# Patient Record
Sex: MALE | Race: WHITE | NOT HISPANIC OR LATINO | URBAN - METROPOLITAN AREA
[De-identification: names, ages, dates, MRNs, and addresses within clinical notes are randomized per-mention and may not be internally consistent; named-entity substitution may affect disease eponyms.]

---

## 2022-07-09 ENCOUNTER — TELEPHONE ENCOUNTER (OUTPATIENT)
Dept: URBAN - METROPOLITAN AREA CLINIC 121 | Facility: CLINIC | Age: 82
End: 2022-07-09

## 2022-07-09 RX ORDER — SIMVASTATIN 20 MG/1
TABLET, FILM COATED ORAL
Refills: 0 | OUTPATIENT
Start: 2014-02-11 | End: 2016-05-26

## 2022-07-09 RX ORDER — ASPIRIN 81 MG/1
TABLET, CHEWABLE ORAL
Refills: 0 | OUTPATIENT
Start: 2014-02-11 | End: 2016-05-26

## 2022-07-09 RX ORDER — AMOXICILLIN AND CLAVULANATE POTASSIUM 875; 125 MG/1; MG/1
TABLET ORAL TWICE A DAY
Refills: 0 | OUTPATIENT
Start: 2016-08-04 | End: 2016-09-15

## 2022-07-09 RX ORDER — DESONIDE 0.5 MG/G
CREAM TOPICAL
Refills: 0 | OUTPATIENT
Start: 2016-07-29 | End: 2016-08-04

## 2022-07-09 RX ORDER — BISOPROLOL FUMARATE 10 MG/1
TABLET ORAL ONCE A DAY
Refills: 0 | OUTPATIENT
Start: 2016-05-26 | End: 2016-07-29

## 2022-07-09 RX ORDER — AMOXICILLIN AND CLAVULANATE POTASSIUM 875; 125 MG/1; MG/1
TWICE A DAY TABLET ORAL TWICE A DAY
Refills: 0 | OUTPATIENT
Start: 2016-07-29 | End: 2016-08-04

## 2022-07-09 RX ORDER — ERTAPENEM SODIUM 1 G/1
INJECT 50 ML EVERY DAY INJECTION, POWDER, LYOPHILIZED, FOR SOLUTION INTRAMUSCULAR; INTRAVENOUS
Refills: 0 | OUTPATIENT
Start: 2016-07-29 | End: 2016-08-04

## 2022-07-09 RX ORDER — OMEPRAZOLE 40 MG/1
CAPSULE, DELAYED RELEASE ORAL ONCE A DAY
Refills: 0 | OUTPATIENT
Start: 2016-07-29 | End: 2016-08-04

## 2022-07-09 RX ORDER — OMEPRAZOLE 40 MG/1
CAPSULE, DELAYED RELEASE ORAL ONCE A DAY
Refills: 0 | OUTPATIENT
Start: 2016-05-26 | End: 2016-07-29

## 2022-07-09 RX ORDER — GLUCOSAM/MSM/CHOND/HRB149/HYAL 500-500 MG
TABLET ORAL
Refills: 0 | OUTPATIENT
Start: 2014-02-11 | End: 2016-05-26

## 2022-07-09 RX ORDER — BISOPROLOL FUMARATE 5 MG/1
1/2 TAB BID TABLET ORAL
Refills: 0 | OUTPATIENT
Start: 2016-07-29 | End: 2016-08-04

## 2022-07-09 RX ORDER — BISOPROLOL FUMARATE 5 MG/1
1/2 TAB BID TABLET ORAL
Refills: 0 | OUTPATIENT
Start: 2016-08-04 | End: 2016-09-15

## 2022-07-09 RX ORDER — ASPIRIN 81 MG/1
TABLET, CHEWABLE ORAL ONCE A DAY
Refills: 0 | OUTPATIENT
Start: 2016-05-26 | End: 2016-07-29

## 2022-07-09 RX ORDER — APIXABAN 5 MG/1
TABLET, FILM COATED ORAL TWICE A DAY
Refills: 0 | OUTPATIENT
Start: 2016-05-26 | End: 2016-07-29

## 2022-07-09 RX ORDER — DESONIDE 0.5 MG/G
CREAM TOPICAL
Refills: 0 | OUTPATIENT
Start: 2016-08-04 | End: 2016-09-15

## 2022-07-09 RX ORDER — OMEPRAZOLE 40 MG/1
CAPSULE, DELAYED RELEASE ORAL
Refills: 0 | OUTPATIENT
Start: 2014-02-11 | End: 2016-05-26

## 2022-07-09 RX ORDER — ATENOLOL 25 MG/1
TABLET ORAL
Refills: 0 | OUTPATIENT
Start: 2014-02-11 | End: 2016-05-26

## 2022-07-09 RX ORDER — ERTAPENEM SODIUM 1 G/1
INJECT 50 ML EVERY DAY INJECTION, POWDER, LYOPHILIZED, FOR SOLUTION INTRAMUSCULAR; INTRAVENOUS
Refills: 0 | OUTPATIENT
Start: 2016-08-04 | End: 2016-09-15

## 2022-07-09 RX ORDER — SIMVASTATIN 20 MG/1
TABLET, FILM COATED ORAL ONCE A DAY
Refills: 0 | OUTPATIENT
Start: 2016-05-26 | End: 2016-07-29

## 2022-07-09 RX ORDER — OMEPRAZOLE 40 MG/1
CAPSULE, DELAYED RELEASE ORAL ONCE A DAY
Refills: 0 | OUTPATIENT
Start: 2016-08-04 | End: 2016-09-15

## 2022-07-09 RX ORDER — ASPIRIN 81 MG/1
TABLET, CHEWABLE ORAL ONCE A DAY
Refills: 0 | OUTPATIENT
Start: 2016-08-04 | End: 2016-09-15

## 2022-07-09 RX ORDER — ASPIRIN 81 MG/1
TABLET, CHEWABLE ORAL ONCE A DAY
Refills: 0 | OUTPATIENT
Start: 2016-07-29 | End: 2016-08-04

## 2022-07-10 ENCOUNTER — TELEPHONE ENCOUNTER (OUTPATIENT)
Dept: URBAN - METROPOLITAN AREA CLINIC 121 | Facility: CLINIC | Age: 82
End: 2022-07-10

## 2022-07-10 RX ORDER — SIMVASTATIN 20 MG/1
TABLET, FILM COATED ORAL ONCE A DAY
Refills: 0 | Status: ACTIVE | COMMUNITY
Start: 2016-09-15

## 2022-07-10 RX ORDER — AMOXICILLIN AND CLAVULANATE POTASSIUM 875; 125 MG/1; MG/1
TABLET ORAL TWICE A DAY
Refills: 0 | Status: ACTIVE | COMMUNITY
Start: 2016-09-15

## 2022-07-10 RX ORDER — BISOPROLOL FUMARATE 5 MG/1
1/2 TAB BID TABLET ORAL TWICE A DAY
Refills: 0 | Status: ACTIVE | COMMUNITY
Start: 2016-09-15

## 2022-07-10 RX ORDER — ASPIRIN 81 MG/1
TABLET, CHEWABLE ORAL ONCE A DAY
Refills: 0 | Status: ACTIVE | COMMUNITY
Start: 2016-09-15

## 2022-07-10 RX ORDER — OMEPRAZOLE 40 MG/1
CAPSULE, DELAYED RELEASE ORAL ONCE A DAY
Refills: 0 | Status: ACTIVE | COMMUNITY
Start: 2016-09-15

## 2022-07-30 ENCOUNTER — TELEPHONE ENCOUNTER (OUTPATIENT)
Age: 82
End: 2022-07-30

## 2022-07-31 ENCOUNTER — TELEPHONE ENCOUNTER (OUTPATIENT)
Age: 82
End: 2022-07-31

## 2024-05-06 ENCOUNTER — DASHBOARD ENCOUNTERS (OUTPATIENT)
Age: 84
End: 2024-05-06

## 2024-05-06 ENCOUNTER — OFFICE VISIT (OUTPATIENT)
Dept: URBAN - METROPOLITAN AREA CLINIC 9 | Facility: CLINIC | Age: 84
End: 2024-05-06
Payer: COMMERCIAL

## 2024-05-06 VITALS
WEIGHT: 149 LBS | DIASTOLIC BLOOD PRESSURE: 70 MMHG | HEIGHT: 71 IN | BODY MASS INDEX: 20.86 KG/M2 | SYSTOLIC BLOOD PRESSURE: 110 MMHG

## 2024-05-06 DIAGNOSIS — K76.0 FATTY LIVER: ICD-10-CM

## 2024-05-06 DIAGNOSIS — R19.4 CHANGE IN BOWEL HABITS: ICD-10-CM

## 2024-05-06 DIAGNOSIS — R17 ELEVATED BILIRUBIN: ICD-10-CM

## 2024-05-06 DIAGNOSIS — R63.4 WEIGHT LOSS: ICD-10-CM

## 2024-05-06 PROBLEM — 197321007: Status: ACTIVE | Noted: 2024-05-06

## 2024-05-06 PROCEDURE — 99204 OFFICE O/P NEW MOD 45 MIN: CPT | Performed by: STUDENT IN AN ORGANIZED HEALTH CARE EDUCATION/TRAINING PROGRAM

## 2024-05-06 RX ORDER — GLUCOSAM/CHONDRO/HERB 149/HYAL 750-100 MG
AS DIRECTED TABLET ORAL
Status: ACTIVE | COMMUNITY

## 2024-05-06 RX ORDER — APIXABAN 5 MG/1
1 TABLET TABLET, FILM COATED ORAL
Status: ACTIVE | COMMUNITY

## 2024-05-06 RX ORDER — ASPIRIN 81 MG/1
TABLET, CHEWABLE ORAL ONCE A DAY
Refills: 0 | Status: DISCONTINUED | COMMUNITY
Start: 2016-09-15

## 2024-05-06 RX ORDER — SIMVASTATIN 20 MG/1
TABLET, FILM COATED ORAL ONCE A DAY
Refills: 0 | Status: DISCONTINUED | COMMUNITY
Start: 2016-09-15

## 2024-05-06 RX ORDER — AMOXICILLIN AND CLAVULANATE POTASSIUM 875; 125 MG/1; MG/1
TABLET ORAL TWICE A DAY
Refills: 0 | Status: DISCONTINUED | COMMUNITY
Start: 2016-09-15

## 2024-05-06 RX ORDER — MULTIVIT WITH MINERALS/LUTEIN
AS DIRECTED TABLET ORAL
Status: ACTIVE | COMMUNITY

## 2024-05-06 RX ORDER — OMEPRAZOLE 40 MG/1
CAPSULE, DELAYED RELEASE ORAL ONCE A DAY
Refills: 0 | Status: ACTIVE | COMMUNITY
Start: 2016-09-15

## 2024-05-06 RX ORDER — BISOPROLOL FUMARATE 5 MG/1
1/2 TAB BID TABLET ORAL TWICE A DAY
Refills: 0 | Status: ACTIVE | COMMUNITY
Start: 2016-09-15

## 2024-05-09 LAB — BILIRUBIN, DIRECT: 0.3

## 2024-05-10 LAB
A/G RATIO: 0.7
ABSOLUTE BASOPHILS: 29
ABSOLUTE EOSINOPHILS: 130
ABSOLUTE LYMPHOCYTES: 760
ABSOLUTE MONOCYTES: 458
ABSOLUTE NEUTROPHILS: 2822
ALBUMIN: 3.3
ALKALINE PHOSPHATASE: 37
ALT (SGPT): 17
AST (SGOT): 32
BASOPHILS: 0.7
BILIRUBIN, TOTAL: 1.1
BUN/CREATININE RATIO: (no result)
BUN: 13
CALCIUM: 8.8
CARBON DIOXIDE, TOTAL: 26
CHLORIDE: 101
CREATININE: 0.71
EGFR: 92
EOSINOPHILS: 3.1
GLOBULIN, TOTAL: 4.5
GLUCOSE: 87
HEMATOCRIT: 38.2
HEMOGLOBIN: 12.6
INR: 1.2
LYMPHOCYTES: 18.1
MCH: 34.6
MCHC: 33
MCV: 104.9
MONOCYTES: 10.9
MPV: 9.7
NEUTROPHILS: 67.2
PLATELET COUNT: 149
POTASSIUM: 4.3
PROTEIN, TOTAL: 7.8
PT: 12.7
RDW: 11.7
RED BLOOD CELL COUNT: 3.64
SODIUM: 137
WHITE BLOOD CELL COUNT: 4.2

## 2024-05-13 ENCOUNTER — TELEPHONE ENCOUNTER (OUTPATIENT)
Dept: URBAN - METROPOLITAN AREA CLINIC 9 | Facility: CLINIC | Age: 84
End: 2024-05-13

## 2024-06-05 ENCOUNTER — LAB OUTSIDE AN ENCOUNTER (OUTPATIENT)
Dept: URBAN - METROPOLITAN AREA CLINIC 9 | Facility: CLINIC | Age: 84
End: 2024-06-05

## 2024-06-10 ENCOUNTER — TELEPHONE ENCOUNTER (OUTPATIENT)
Dept: URBAN - METROPOLITAN AREA CLINIC 9 | Facility: CLINIC | Age: 84
End: 2024-06-10

## 2024-06-21 ENCOUNTER — OFFICE VISIT (OUTPATIENT)
Dept: URBAN - METROPOLITAN AREA CLINIC 9 | Facility: CLINIC | Age: 84
End: 2024-06-21

## 2024-06-24 ENCOUNTER — TELEPHONE ENCOUNTER (OUTPATIENT)
Dept: URBAN - METROPOLITAN AREA CLINIC 9 | Facility: CLINIC | Age: 84
End: 2024-06-24

## 2024-06-25 ENCOUNTER — OFFICE VISIT (OUTPATIENT)
Dept: URBAN - METROPOLITAN AREA CLINIC 9 | Facility: CLINIC | Age: 84
End: 2024-06-25

## 2024-06-25 VITALS
DIASTOLIC BLOOD PRESSURE: 78 MMHG | BODY MASS INDEX: 21.14 KG/M2 | SYSTOLIC BLOOD PRESSURE: 120 MMHG | HEIGHT: 71 IN | WEIGHT: 151 LBS

## 2024-06-25 RX ORDER — GLUCOSAM/CHONDRO/HERB 149/HYAL 750-100 MG
AS DIRECTED TABLET ORAL
Status: ACTIVE | COMMUNITY

## 2024-06-25 RX ORDER — OMEPRAZOLE 40 MG/1
CAPSULE, DELAYED RELEASE ORAL ONCE A DAY
Refills: 0 | Status: DISCONTINUED | COMMUNITY
Start: 2016-09-15

## 2024-06-25 RX ORDER — APIXABAN 5 MG/1
1 TABLET TABLET, FILM COATED ORAL
Status: ACTIVE | COMMUNITY

## 2024-06-25 RX ORDER — BISOPROLOL FUMARATE 5 MG/1
1/2 TAB BID TABLET ORAL TWICE A DAY
Refills: 0 | Status: ACTIVE | COMMUNITY
Start: 2016-09-15

## 2024-06-25 RX ORDER — MULTIVIT WITH MINERALS/LUTEIN
AS DIRECTED TABLET ORAL
Status: DISCONTINUED | COMMUNITY

## 2024-06-25 NOTE — HPI-TODAY'S VISIT:
EGD: No recnet  Colonoscopy: 2016- SWFL- IH, sigmoid/desc/asc divertic.  Imaging/Studies/Procedures: 11/1/23- CBC (plt 130), CMP (T bili 1.9). RUQ U/S 2/28/24- small ascites, fatty liver. CT Chest 4/30/24- chronic fibrotic lung changes worsened since last exam, cardiomegaly.  5/8/24- CBC (Hgb 12.6, ), CMP normal, direct bili 0.3, INR 1.2. Fibroscan 6/5/24- S0-1/F1-3.  ----  PMH:  liver abscess/mass, portal vein thrombosis/stenosis, splenomegaly, A fib, colovesical fistula, CAD,    Family Hx:   GI Hx: 5/6/24- Referred for abnormla imaging and labs. Reviewed imaging and bilirubin with patient. It appears he also has some possible cardiomegaly which can contribute to findings of elevated bilirubin from congestive hepatopathy. Otherwise, mostly asymptomatic. 6/25/24- The patient, an 83-year-old male, presented with a history of bowel changes and weight loss. He reported that his bowel changes had resolved and he believed this was the reason for his weight loss. He also mentioned that he was experiencing frequent bowel movements. The patient had been undergoing workup for liver enzyme abnormalities, which had normalized according to recent labs. A fibro scan showed denseness to the liver, indicating fibrosis, but no cirrhosis. The patient also reported having five stents and being on Eliquis, which had been reduced from 10mg to 5mg due to some bleeding. He had a history of rectal bleeding when on the higher dose of Eliquis. His last colonoscopy was in 2016, and he was recommended a 10-year follow-up. However, due to his age and recent rectal bleeding, the doctor suggested considering moving up the colonoscopy.

## 2024-06-27 ENCOUNTER — OFFICE VISIT (OUTPATIENT)
Dept: URBAN - METROPOLITAN AREA CLINIC 9 | Facility: CLINIC | Age: 84
End: 2024-06-27